# Patient Record
Sex: MALE | Race: BLACK OR AFRICAN AMERICAN | NOT HISPANIC OR LATINO | URBAN - METROPOLITAN AREA
[De-identification: names, ages, dates, MRNs, and addresses within clinical notes are randomized per-mention and may not be internally consistent; named-entity substitution may affect disease eponyms.]

---

## 2024-09-03 ENCOUNTER — HOSPITAL ENCOUNTER (EMERGENCY)
Facility: HOSPITAL | Age: 24
Discharge: HOME | End: 2024-09-03

## 2024-09-03 VITALS
TEMPERATURE: 100.3 F | BODY MASS INDEX: 23.57 KG/M2 | RESPIRATION RATE: 16 BRPM | HEART RATE: 65 BPM | SYSTOLIC BLOOD PRESSURE: 129 MMHG | OXYGEN SATURATION: 99 % | WEIGHT: 174 LBS | HEIGHT: 72 IN | DIASTOLIC BLOOD PRESSURE: 68 MMHG

## 2024-09-03 DIAGNOSIS — U07.1 COVID-19: Primary | ICD-10-CM

## 2024-09-03 LAB — SARS-COV-2 RNA RESP QL NAA+PROBE: DETECTED

## 2024-09-03 PROCEDURE — 99283 EMERGENCY DEPT VISIT LOW MDM: CPT

## 2024-09-03 PROCEDURE — 2500000001 HC RX 250 WO HCPCS SELF ADMINISTERED DRUGS (ALT 637 FOR MEDICARE OP)

## 2024-09-03 PROCEDURE — 87635 SARS-COV-2 COVID-19 AMP PRB: CPT | Performed by: PHYSICIAN ASSISTANT

## 2024-09-03 RX ORDER — ACETAMINOPHEN 325 MG/1
975 TABLET ORAL ONCE
Status: COMPLETED | OUTPATIENT
Start: 2024-09-03 | End: 2024-09-03

## 2024-09-03 RX ORDER — IBUPROFEN 400 MG/1
400 TABLET ORAL ONCE
Status: COMPLETED | OUTPATIENT
Start: 2024-09-03 | End: 2024-09-03

## 2024-09-03 ASSESSMENT — PAIN SCALES - GENERAL
PAINLEVEL_OUTOF10: 7
PAINLEVEL_OUTOF10: 0 - NO PAIN

## 2024-09-03 ASSESSMENT — PAIN - FUNCTIONAL ASSESSMENT: PAIN_FUNCTIONAL_ASSESSMENT: 0-10

## 2024-09-03 NOTE — ED PROVIDER NOTES
HPI   Chief Complaint   Patient presents with    Flu Symptoms     Flu like symptoms       History of present illness: 24-year-old male complains of fever cough myalgias.  Patient also has generalized fatigue.  Cough is unproductive.  Denies sore throat neck pain neck stiffness headache chest pain shortness of breath lightheadedness dizziness abdominal pain diarrhea constipation dysuria polyuria.    Review of systems: Constitutional, eye, ENT, cardiovascular, respiratory, gastrointestinal, genitourinary, neurologic, musculoskeletal, dermatologic, hematologic, endocrine systems were evaluated and were negative unless otherwise specified in history of present illness.    Medications: Reviewed and per nursing note.    Family history: Denies relevant medical conditions.    Past medical history: None per patient    Social history: Denies tobacco, alcohol, drug use.        Physical exam:    Appearance: Well-developed, well-nourished, nontoxic-appearing, alert and oriented x3. Talking in complete sentences.    HEENT:  Head normocephalic atraumatic, extraocular movements intact, pupils equal round reactive to light, mucous membranes are moist and pink, normal facial symmetry. No pharyngeal erythema, edema, exudates. Uvula is midline with no stridor, drooling, trismus. No induration the floor of the mouth.  Normal tympanic membranes.    NECK:  Nml Inspection, no meningismus, no thyromegaly, no lymphadenopathy, trachea is midline, no JVD.    Respiratory: Clear to auscultation bilaterally with normal bilateral excursion. No wheezes, rhonchi, rales.    Cardiovascular: Regular rate and rhythm, no murmurs rubs or gallops. Pulses 2+ symmetrically in the dorsalis pedis and radial pulses.    Abdomen/GI:  Soft, nontender.    :  No CVA tenderness    Neuro:  Oriented x 3, Speech Clear, cranial nerves grossly intact.    Musculoskeletal: Patient spontaneously moves all 4 extremities.    Skin:  No rashes.              Patient History    No past medical history on file.  No past surgical history on file.  No family history on file.  Social History     Tobacco Use    Smoking status: Not on file    Smokeless tobacco: Not on file   Substance Use Topics    Alcohol use: Not on file    Drug use: Not on file       Physical Exam   ED Triage Vitals [09/03/24 1739]   Temperature Heart Rate Respirations BP   (!) 37.9 °C (100.3 °F) 65 16 129/68      Pulse Ox Temp src Heart Rate Source Patient Position   99 % -- -- --      BP Location FiO2 (%)     -- --       Physical Exam      ED Course & MDM   Diagnoses as of 09/03/24 1930   COVID-19                 No data recorded     [place] Coma Scale Score: 15 (09/03/24 1740 : Bob Carreno RN)                           Medical Decision Making  Labs Reviewed  SARS-COV-2 PCR - Abnormal      Patient complains of cough and congestion.  Differential diagnosis of COVID-19, influenza, pneumonia, otitis media, tonsillitis, meningitis, sinusitis.  Examination shows lungs clear to auscultation, normal tympanic membranes, no meningeal signs, no sinus tenderness making pneumonia, otitis media, meningitis, sinusitis unlikely.      COVID-19 swabs ordered.  Given Tylenol Motrin.  COVID-19 positive.  Patient had improvement of symptoms.  No evidence of complication such as secondary bacterial infection.    Patient will be discharged to home.  Patient is educated in signs and symptoms of worsening symptoms and reasons to come back to the emergency department.  Will need to follow up with primary care provider.  Patient does not report social determinants of health impacting ability to obtain care that is needed.  Patient agrees with plan.    This is a transcription.  Text was reviewed for errors, but some transcription errors may remain.  Please call for any questions.          Procedure  Procedures     Eddie Looney PA-C  09/03/24 1930

## 2025-02-27 ENCOUNTER — HOSPITAL ENCOUNTER (EMERGENCY)
Facility: HOSPITAL | Age: 25
Discharge: HOME | End: 2025-02-27
Payer: MEDICAID

## 2025-02-27 VITALS
OXYGEN SATURATION: 99 % | BODY MASS INDEX: 24.38 KG/M2 | HEIGHT: 72 IN | DIASTOLIC BLOOD PRESSURE: 81 MMHG | WEIGHT: 180 LBS | HEART RATE: 68 BPM | SYSTOLIC BLOOD PRESSURE: 133 MMHG | TEMPERATURE: 97.5 F | RESPIRATION RATE: 16 BRPM

## 2025-02-27 DIAGNOSIS — R36.9 PENILE DISCHARGE: ICD-10-CM

## 2025-02-27 DIAGNOSIS — Z20.2 EXPOSURE TO STD: Primary | ICD-10-CM

## 2025-02-27 LAB
APPEARANCE UR: ABNORMAL
BACTERIA #/AREA URNS AUTO: ABNORMAL /HPF
BILIRUB UR STRIP.AUTO-MCNC: NEGATIVE MG/DL
COLOR UR: YELLOW
GLUCOSE UR STRIP.AUTO-MCNC: NORMAL MG/DL
KETONES UR STRIP.AUTO-MCNC: NEGATIVE MG/DL
LEUKOCYTE ESTERASE UR QL STRIP.AUTO: ABNORMAL
MUCOUS THREADS #/AREA URNS AUTO: ABNORMAL /LPF
NITRITE UR QL STRIP.AUTO: NEGATIVE
PH UR STRIP.AUTO: 6 [PH]
PROT UR STRIP.AUTO-MCNC: ABNORMAL MG/DL
RBC # UR STRIP.AUTO: NEGATIVE MG/DL
RBC #/AREA URNS AUTO: ABNORMAL /HPF
SP GR UR STRIP.AUTO: 1.04
UROBILINOGEN UR STRIP.AUTO-MCNC: ABNORMAL MG/DL
WBC #/AREA URNS AUTO: >50 /HPF

## 2025-02-27 PROCEDURE — 2500000001 HC RX 250 WO HCPCS SELF ADMINISTERED DRUGS (ALT 637 FOR MEDICARE OP)

## 2025-02-27 PROCEDURE — 87661 TRICHOMONAS VAGINALIS AMPLIF: CPT | Mod: AHULAB

## 2025-02-27 PROCEDURE — 99284 EMERGENCY DEPT VISIT MOD MDM: CPT

## 2025-02-27 PROCEDURE — 81001 URINALYSIS AUTO W/SCOPE: CPT

## 2025-02-27 PROCEDURE — 96372 THER/PROPH/DIAG INJ SC/IM: CPT

## 2025-02-27 PROCEDURE — 87491 CHLMYD TRACH DNA AMP PROBE: CPT | Mod: AHULAB

## 2025-02-27 PROCEDURE — 2500000004 HC RX 250 GENERAL PHARMACY W/ HCPCS (ALT 636 FOR OP/ED)

## 2025-02-27 PROCEDURE — 87086 URINE CULTURE/COLONY COUNT: CPT | Mod: AHULAB

## 2025-02-27 RX ORDER — METRONIDAZOLE 500 MG/1
2000 TABLET ORAL ONCE
Status: COMPLETED | OUTPATIENT
Start: 2025-02-27 | End: 2025-02-27

## 2025-02-27 RX ORDER — CEFTRIAXONE 500 MG/1
500 INJECTION, POWDER, FOR SOLUTION INTRAMUSCULAR; INTRAVENOUS ONCE
Status: COMPLETED | OUTPATIENT
Start: 2025-02-27 | End: 2025-02-27

## 2025-02-27 RX ORDER — DOXYCYCLINE 100 MG/1
100 CAPSULE ORAL 2 TIMES DAILY
Qty: 14 CAPSULE | Refills: 0 | Status: SHIPPED | OUTPATIENT
Start: 2025-02-27 | End: 2025-03-06

## 2025-02-27 RX ORDER — DOXYCYCLINE HYCLATE 100 MG
100 TABLET ORAL ONCE
Status: COMPLETED | OUTPATIENT
Start: 2025-02-27 | End: 2025-02-27

## 2025-02-27 RX ADMIN — DOXYCYCLINE HYCLATE 100 MG: 100 TABLET, COATED ORAL at 19:16

## 2025-02-27 RX ADMIN — CEFTRIAXONE SODIUM 500 MG: 500 INJECTION, POWDER, FOR SOLUTION INTRAMUSCULAR; INTRAVENOUS at 19:16

## 2025-02-27 RX ADMIN — METRONIDAZOLE 2000 MG: 500 TABLET ORAL at 19:16

## 2025-02-27 ASSESSMENT — PAIN SCALES - GENERAL: PAINLEVEL_OUTOF10: 5 - MODERATE PAIN

## 2025-02-27 ASSESSMENT — PAIN - FUNCTIONAL ASSESSMENT: PAIN_FUNCTIONAL_ASSESSMENT: 0-10

## 2025-02-27 NOTE — DISCHARGE INSTRUCTIONS
Take doxycycline 2 times a day for 7 days  Please follow-up results of STD testing in MyChart  Referral order for PCP placed.  Can stop at  in waiting room to schedule an appointment before you leave today  Return to ED for any new or worsening symptoms

## 2025-02-28 LAB
C TRACH RRNA SPEC QL NAA+PROBE: NEGATIVE
N GONORRHOEA DNA SPEC QL PROBE+SIG AMP: POSITIVE
T VAGINALIS RRNA SPEC QL NAA+PROBE: NEGATIVE

## 2025-02-28 NOTE — ED PROVIDER NOTES
HPI   Chief Complaint   Patient presents with    Exposure to STD       Patient is a 24-year-old male with benign PMH presenting to the ED with concern for STD exposure.  Patient states for the past couple of days he has had a yellow discharge from his penis.  Starting today he has began to experience dysuria.  Denies any penile lesions, rashes, testicular pain or swelling, fever, chills, abdominal pain, hematuria.  Patient is sexually active with 2 female partners and does not use protection.  Patient has a history of gonorrhea in the past.        Patient History   No past medical history on file.  No past surgical history on file.  No family history on file.  Social History     Tobacco Use    Smoking status: Not on file    Smokeless tobacco: Not on file   Substance Use Topics    Alcohol use: Not on file    Drug use: Not on file       Physical Exam   ED Triage Vitals [02/27/25 1747]   Temperature Heart Rate Respirations BP   36.4 °C (97.5 °F) 68 16 133/81      Pulse Ox Temp src Heart Rate Source Patient Position   99 % -- -- --      BP Location FiO2 (%)     -- --       Physical Exam  Exam conducted with a chaperone present.   Constitutional:       General: He is not in acute distress.     Appearance: Normal appearance. He is not ill-appearing, toxic-appearing or diaphoretic.   HENT:      Head: Normocephalic and atraumatic.      Mouth/Throat:      Mouth: Mucous membranes are moist.      Pharynx: Oropharynx is clear. No oropharyngeal exudate or posterior oropharyngeal erythema.   Eyes:      General: No scleral icterus.        Right eye: No discharge.         Left eye: No discharge.      Extraocular Movements: Extraocular movements intact.      Conjunctiva/sclera: Conjunctivae normal.      Pupils: Pupils are equal, round, and reactive to light.   Cardiovascular:      Rate and Rhythm: Normal rate and regular rhythm.      Heart sounds: Normal heart sounds. No murmur heard.     No friction rub. No gallop.   Pulmonary:       Effort: Pulmonary effort is normal. No respiratory distress.      Breath sounds: Normal breath sounds. No stridor. No wheezing, rhonchi or rales.   Abdominal:      General: Abdomen is flat. Bowel sounds are normal. There is no distension.      Palpations: Abdomen is soft.      Tenderness: There is no abdominal tenderness. There is no right CVA tenderness, left CVA tenderness or guarding.   Genitourinary:     Pubic Area: No rash or pubic lice.       Penis: Normal and circumcised. No erythema, tenderness, discharge, swelling or lesions.       Testes:         Right: Mass, tenderness, swelling, testicular hydrocele or varicocele not present. Right testis is descended. Cremasteric reflex is present.          Left: Mass, tenderness, swelling, testicular hydrocele or varicocele not present. Left testis is descended. Cremasteric reflex is present.       Epididymis:      Right: Normal.      Left: Normal.      Comments: No penile discharge seen on exam.      The sensitive examination was discussed with the Patient.  The Patient has agreed to proceed with the sensitive examination.       Musculoskeletal:         General: Normal range of motion.      Cervical back: Normal range of motion and neck supple.   Skin:     General: Skin is warm and dry.      Capillary Refill: Capillary refill takes less than 2 seconds.   Neurological:      General: No focal deficit present.      Mental Status: He is alert and oriented to person, place, and time.   Psychiatric:         Mood and Affect: Mood normal.         Behavior: Behavior normal.           ED Course & [facility]   ED Course as of 02/27/25 2112   Thu Feb 27, 2025 2110 Patient is a 24-year-old male presenting to the ED with concern for STD exposure.  Differentials include gonorrhea, chlamydia, trichomonas, UTI.  Vital signs are stable, patient is nontoxic-appearing.   exam unremarkable.  Will send tests for gonorrhea, chlamydia, trichomonas, and UA.  Given patient is symptomatic with  penile discharge offered treatment in ED.  Patient would like to start treatment.  Will give one-time dose of Flagyl, Rocephin, and doxycycline for STD treatment.  Patient told he can follow-up results of STD testing MyChart.  Patient he does not have a PCP.  PCP referral placed and patient encouraged to establish care.  Patient told to return to ED for any new or worsening symptoms. [VS]      ED Course User Index  [VS] Flor Salazar PA-C         Diagnoses as of 02/27/25 2112   Exposure to STD   Penile discharge                 No data recorded                                 Medical Decision Making  Chronic Medical Conditions Significantly Affecting Care:      Escalation of Care: Appropriate for outpatient management  Counseling: Spoke with the patient and discussed today´s findings, in addition to providing specific details for the plan of care and expected course.  Patient was given the opportunity to ask questions.    Discussed return precautions and importance of follow-up.  Advised to follow-up with PCP.  Advised to return to the ED for changing or worsening symptoms, new symptoms, complaint specific precautions, and precautions listed on the discharge paperwork.  Educated on the common potential side effects of medications prescribed.    I advised the patient that the emergency evaluation and treatment provided today doesn't end their need for medical care. It is very important that they follow-up with their primary care provider or other specialist as instructed.    The plan of care was mutually agreed upon with the patient. The patient and/or family were given the opportunity to ask questions. All questions asked today in the ED were answered to the best of my ability with today's information.    I specifically advised the patient to return to the ED for changing or worsening symptoms, worrisome new symptoms, or for any complaint specific precautions listed on the discharge  paperwork.      Procedure  Procedures     Flor Salazar PA-C  02/27/25 2992

## 2025-03-01 LAB — BACTERIA UR CULT: NO GROWTH
